# Patient Record
Sex: FEMALE | Race: WHITE | NOT HISPANIC OR LATINO | Employment: UNEMPLOYED | URBAN - METROPOLITAN AREA
[De-identification: names, ages, dates, MRNs, and addresses within clinical notes are randomized per-mention and may not be internally consistent; named-entity substitution may affect disease eponyms.]

---

## 2020-02-14 ENCOUNTER — OFFICE VISIT (OUTPATIENT)
Dept: GASTROENTEROLOGY | Facility: CLINIC | Age: 45
End: 2020-02-14
Payer: COMMERCIAL

## 2020-02-14 DIAGNOSIS — R10.31 RIGHT LOWER QUADRANT ABDOMINAL PAIN: Primary | ICD-10-CM

## 2020-02-14 DIAGNOSIS — Z80.0 FAMILY HISTORY OF COLON CANCER IN FATHER: ICD-10-CM

## 2020-02-14 PROCEDURE — 99244 OFF/OP CNSLTJ NEW/EST MOD 40: CPT | Performed by: INTERNAL MEDICINE

## 2020-02-14 RX ORDER — B-COMPLEX WITH VITAMIN C
TABLET ORAL DAILY
COMMUNITY

## 2020-02-14 NOTE — LETTER
February 14, 2020     Referral [address] [facility] [address]    Patient: Zenon Bailey   YOB: 1975   Date of Visit: 2/14/2020       Dear Dr Jeancarlos Upton:    Thank you for referring Zenon Bailey to me for evaluation  Below are my notes for this consultation  If you have questions, please do not hesitate to call me  I look forward to following your patient along with you  Sincerely,        Brayan Cam MD        CC: Carter Kirkland MD  2/14/2020 12:51 PM  Sign at close encounter  Consultation - [facility]  Zenon Bailey 38 y o  female MRN: [id number]  Unit/Bed#:  Encounter: [id number]        Consults    ASSESSMENT/PLAN:     1  Right lower quadrant pain-suspect could be functional, however given family history of colon cancer, would recommend colonoscopy at this time  She is overdue  If the index colonoscopy does not show any evidence of colon polyps, would recommend repeat at 5 year interval nonetheless   -would also obtain CT of abdomen and pelvis to assess for inflammatory bowel disease/diverticulitis  -meanwhile, start on low FODMAP diet  -start on daily probiotic   -check CBC, CMP and TSH at this time  -celiac serologies in the past were normal   -obtain results of the previous ultrasound  ______________________________________________________________________    Reason for Consult / Principal Problem: [unfilled]    HPI: Zenon Bailey is a 38y o  year old female presents for evaluation of right lower quadrant abdominal pain for the past 15 years  Patient reports that she has undergone some workup including what sounds like right upper quadrant ultrasound which was unremarkable  I do not have these records  Patient was seen by Gastroenterology last year in [facility] and was supposed to have a CT of abdomen and pelvis along with colonoscopy but this was not done    She states that CT scan was denied by her insurance  Patient reports that she has been having worsening right-sided abdominal pain along with constipation  She states that pain is often aggravated by certain dietary indiscretions, often with things like meat or peanut butter  She also states that intake of wheat worsens her symptoms  She has tested negative for celiac disease in the past   She also has family history of colon cancer in her father who passed away in late 45s  She has never had a screening colonoscopy herself  She denies any blood in her stool, melena, unintentional weight loss or loss of appetite  Denies any upper GI symptoms including heartburn, dysphagia, hematemesis or melena  Labs done previously in April 2019 were notable for normal state fecal calprotectin, stool PCR was normal   C diff is negative, CBC was normal, LFTs were normal   TSH was normal last year  Review of Systems: The remainder of the review of systems was negative except for the pertinent positives noted in HPI  Historical Information   Past Medical History:   Diagnosis Date    Hyperlipidemia     Mitral prolapse      History reviewed  No pertinent surgical history  Social History   Social History     Substance and Sexual Activity   Alcohol Use Never    Frequency: Never     Social History     Substance and Sexual Activity   Drug Use Never     Social History     Tobacco Use   Smoking Status Never Smoker   Smokeless Tobacco Never Used     Family History   Problem Relation Age of Onset    Colon cancer Father        Meds/Allergies       (Not in a hospital admission)  No current facility-administered medications for this visit  Allergies   Allergen Reactions    Blue Dyes (Parenteral)      Other reaction(s): Other (See Comments)  "Flu-like symptoms"    Gluten Meal      Other reaction(s):  Other (See Comments)  "Flu-like symptoms & lethargy"       Objective     Blood pressure 130/80, pulse 98, temperature 98 7 °F (37 1 °C), height 5' 6" (1 676 m), weight 9 979 kg (22 lb)  [unfilled]    PHYSICAL EXAM     GEN: well nourished, well developed, no acute distress  HEENT: anicteric, MMM, no cervical or supraclavicular lymphadenopathy  CV: RRR, no m/r/g  CHEST: CTA b/l, no WRR  ABD: +BS, soft, NT/ND, no hepatosplenomegaly  EXT: no c/c/e  SKIN: no rashes,  NEURO: aaox3    Lab Results:   No visits with results within 1 Day(s) from this visit  Latest known visit with results is:   No results found for any previous visit       Imaging Studies: I have personally reviewed pertinent films in PACS

## 2020-02-14 NOTE — PROGRESS NOTES
Consultation - [facility]  Vernell Kraft 38 y o  female MRN: [id number]  Unit/Bed#:  Encounter: [id number]        Consults    ASSESSMENT/PLAN:     1  Right lower quadrant pain-suspect could be functional, however given family history of colon cancer, would recommend colonoscopy at this time  She is overdue  If the index colonoscopy does not show any evidence of colon polyps, would recommend repeat at 5 year interval nonetheless   -would also obtain CT of abdomen and pelvis to assess for inflammatory bowel disease/diverticulitis  -meanwhile, start on low FODMAP diet  -start on daily probiotic   -check CBC, CMP and TSH at this time  -celiac serologies in the past were normal   -obtain results of the previous ultrasound  ______________________________________________________________________    Reason for Consult / Principal Problem: [unfilled]    HPI: Vernell Kraft is a 38y o  year old female presents for evaluation of right lower quadrant abdominal pain for the past 15 years  Patient reports that she has undergone some workup including what sounds like right upper quadrant ultrasound which was unremarkable  I do not have these records  Patient was seen by Gastroenterology last year in [facility] and was supposed to have a CT of abdomen and pelvis along with colonoscopy but this was not done  She states that CT scan was denied by her insurance  Patient reports that she has been having worsening right-sided abdominal pain along with constipation  She states that pain is often aggravated by certain dietary indiscretions, often with things like meat or peanut butter  She also states that intake of wheat worsens her symptoms  She has tested negative for celiac disease in the past   She also has family history of colon cancer in her father who passed away in late 45s  She has never had a screening colonoscopy herself    She denies any blood in her stool, melena, unintentional weight loss or loss of appetite  Denies any upper GI symptoms including heartburn, dysphagia, hematemesis or melena  Labs done previously in April 2019 were notable for normal state fecal calprotectin, stool PCR was normal   C diff is negative, CBC was normal, LFTs were normal   TSH was normal last year  Review of Systems: The remainder of the review of systems was negative except for the pertinent positives noted in HPI  Historical Information   Past Medical History:   Diagnosis Date    Hyperlipidemia     Mitral prolapse      History reviewed  No pertinent surgical history  Social History   Social History     Substance and Sexual Activity   Alcohol Use Never    Frequency: Never     Social History     Substance and Sexual Activity   Drug Use Never     Social History     Tobacco Use   Smoking Status Never Smoker   Smokeless Tobacco Never Used     Family History   Problem Relation Age of Onset    Colon cancer Father        Meds/Allergies       (Not in a hospital admission)  No current facility-administered medications for this visit  Allergies   Allergen Reactions    Blue Dyes (Parenteral)      Other reaction(s): Other (See Comments)  "Flu-like symptoms"    Gluten Meal      Other reaction(s): Other (See Comments)  "Flu-like symptoms & lethargy"       Objective     Blood pressure 130/80, pulse 98, temperature 98 7 °F (37 1 °C), height 5' 6" (1 676 m), weight 9 979 kg (22 lb)  [unfilled]    PHYSICAL EXAM     GEN: well nourished, well developed, no acute distress  HEENT: anicteric, MMM, no cervical or supraclavicular lymphadenopathy  CV: RRR, no m/r/g  CHEST: CTA b/l, no WRR  ABD: +BS, soft, NT/ND, no hepatosplenomegaly  EXT: no c/c/e  SKIN: no rashes,  NEURO: aaox3    Lab Results:   No visits with results within 1 Day(s) from this visit  Latest known visit with results is:   No results found for any previous visit       Imaging Studies: I have personally reviewed pertinent films in PACS

## 2020-02-17 ENCOUNTER — TELEPHONE (OUTPATIENT)
Dept: GASTROENTEROLOGY | Facility: CLINIC | Age: 45
End: 2020-02-17

## 2020-02-17 NOTE — TELEPHONE ENCOUNTER
Started the PA via [phone] submitting the clinicals awaiting the determination    Case is patient's Insurance Number  [id number]      Fax Number:  [phone]

## 2020-02-18 VITALS
DIASTOLIC BLOOD PRESSURE: 80 MMHG | TEMPERATURE: 98.7 F | SYSTOLIC BLOOD PRESSURE: 130 MMHG | HEIGHT: 66 IN | HEART RATE: 98 BPM | WEIGHT: 222 LBS | BODY MASS INDEX: 35.68 KG/M2

## 2020-02-18 NOTE — TELEPHONE ENCOUNTER
Received a fax stating needed more clinicals called the company never received them yesterday resent again

## 2020-02-20 NOTE — TELEPHONE ENCOUNTER
Called and spoke to [facility] [employer] Reviewer she stated the test still with clinicals does not meet guidelines due to no scopes being done     they only have two options after this which is 10 days from denial a peer to peer can be done or a new request can be start which I tried and it's still not meeting guidelines with other options     Number to call:  [phone]    Option 1,1,1 then inform a peer to peer would like to be done then they transfer

## 2020-02-21 NOTE — TELEPHONE ENCOUNTER
Patient is scheduled for colonoscopy 3/13  Since insurance company denied for dx r/o diverticulitis, IBD until colonoscopy done first, please reschedule CT scan until after scope completed  If they still require a peer to peer at that time, please send it to me  Thanks for your help

## 2020-02-21 NOTE — TELEPHONE ENCOUNTER
lmom relaying this will have to be cancelled and if the Provider still wants to proceed at that time we can submit the colon with the clinicals    Called and spoke to rupa she canceled the CT scan     Place updated note into the referral